# Patient Record
Sex: MALE | Race: WHITE | NOT HISPANIC OR LATINO | Employment: OTHER | URBAN - METROPOLITAN AREA
[De-identification: names, ages, dates, MRNs, and addresses within clinical notes are randomized per-mention and may not be internally consistent; named-entity substitution may affect disease eponyms.]

---

## 2017-01-01 ENCOUNTER — APPOINTMENT (OUTPATIENT)
Dept: GENERAL RADIOLOGY | Facility: HOSPITAL | Age: 72
End: 2017-01-01

## 2017-01-01 ENCOUNTER — HOSPITAL ENCOUNTER (INPATIENT)
Facility: HOSPITAL | Age: 72
LOS: 1 days | End: 2017-07-05
Attending: EMERGENCY MEDICINE | Admitting: INTERNAL MEDICINE

## 2017-01-01 VITALS — WEIGHT: 160 LBS | HEIGHT: 66 IN | RESPIRATION RATE: 29 BRPM | BODY MASS INDEX: 25.71 KG/M2 | TEMPERATURE: 96.6 F

## 2017-01-01 DIAGNOSIS — R65.21 SEPTIC SHOCK (HCC): Primary | ICD-10-CM

## 2017-01-01 DIAGNOSIS — J18.9 PNEUMONIA OF RIGHT MIDDLE LOBE DUE TO INFECTIOUS ORGANISM: ICD-10-CM

## 2017-01-01 DIAGNOSIS — A41.9 SEPTIC SHOCK (HCC): Primary | ICD-10-CM

## 2017-01-01 DIAGNOSIS — N28.9 RENAL INSUFFICIENCY: ICD-10-CM

## 2017-01-01 DIAGNOSIS — N39.0 ACUTE UTI: ICD-10-CM

## 2017-01-01 LAB
ALBUMIN SERPL-MCNC: 3.3 G/DL (ref 3.5–5.2)
ALBUMIN/GLOB SERPL: 0.9 G/DL
ALP SERPL-CCNC: 119 U/L (ref 39–117)
ALT SERPL W P-5'-P-CCNC: 21 U/L (ref 1–41)
ANION GAP SERPL CALCULATED.3IONS-SCNC: 25.4 MMOL/L
ANISOCYTOSIS BLD QL: ABNORMAL
ARTERIAL PATENCY WRIST A: ABNORMAL
AST SERPL-CCNC: 16 U/L (ref 1–40)
ATMOSPHERIC PRESS: 751 MMHG
BACTERIA UR QL AUTO: ABNORMAL /HPF
BASE EXCESS BLDA CALC-SCNC: -4.3 MMOL/L (ref 0–2)
BDY SITE: ABNORMAL
BILIRUB SERPL-MCNC: 0.4 MG/DL (ref 0.1–1.2)
BILIRUB UR QL STRIP: NEGATIVE
BUN BLD-MCNC: 77 MG/DL (ref 8–23)
BUN/CREAT SERPL: 32 (ref 7–25)
CALCIUM SPEC-SCNC: 10.4 MG/DL (ref 8.6–10.5)
CHLORIDE SERPL-SCNC: 108 MMOL/L (ref 98–107)
CLARITY UR: ABNORMAL
CO2 SERPL-SCNC: 22.6 MMOL/L (ref 22–29)
COLOR UR: ABNORMAL
CREAT BLD-MCNC: 2.41 MG/DL (ref 0.76–1.27)
D-LACTATE SERPL-SCNC: 6.1 MMOL/L (ref 0.5–2)
DEPRECATED RDW RBC AUTO: 50.1 FL (ref 37–54)
ERYTHROCYTE [DISTWIDTH] IN BLOOD BY AUTOMATED COUNT: 15.2 % (ref 11.5–14.5)
GFR SERPL CREATININE-BSD FRML MDRD: 27 ML/MIN/1.73
GLOBULIN UR ELPH-MCNC: 3.6 GM/DL
GLUCOSE BLD-MCNC: 390 MG/DL (ref 65–99)
GLUCOSE UR STRIP-MCNC: NEGATIVE MG/DL
HCO3 BLDA-SCNC: 20.3 MMOL/L (ref 22–28)
HCT VFR BLD AUTO: 40.4 % (ref 40.4–52.2)
HGB BLD-MCNC: 12.2 G/DL (ref 13.7–17.6)
HGB UR QL STRIP.AUTO: ABNORMAL
HOLD SPECIMEN: NORMAL
HOLD SPECIMEN: NORMAL
HOROWITZ INDEX BLD+IHG-RTO: 100 %
HYALINE CASTS UR QL AUTO: ABNORMAL /LPF
KETONES UR QL STRIP: NEGATIVE
LEUKOCYTE ESTERASE UR QL STRIP.AUTO: ABNORMAL
LYMPHOCYTES # BLD MANUAL: 1.57 10*3/MM3 (ref 0.9–4.8)
LYMPHOCYTES NFR BLD MANUAL: 6 % (ref 5–12)
LYMPHOCYTES NFR BLD MANUAL: 8 % (ref 19.6–45.3)
MCH RBC QN AUTO: 27.2 PG (ref 27–32.7)
MCHC RBC AUTO-ENTMCNC: 30.2 G/DL (ref 32.6–36.4)
MCV RBC AUTO: 90.2 FL (ref 79.8–96.2)
MODALITY: ABNORMAL
MONOCYTES # BLD AUTO: 1.18 10*3/MM3 (ref 0.2–1.2)
NEUTROPHILS # BLD AUTO: 16.92 10*3/MM3 (ref 1.9–8.1)
NEUTROPHILS NFR BLD MANUAL: 86 % (ref 42.7–76)
NITRITE UR QL STRIP: NEGATIVE
O2 A-A PPRESDIFF RESPIRATORY: 0.1 MMHG
PCO2 BLDA: 35 MM HG (ref 35–45)
PH BLDA: 7.37 PH UNITS (ref 7.35–7.45)
PH UR STRIP.AUTO: 7 [PH] (ref 5–8)
PLAT MORPH BLD: NORMAL
PLATELET # BLD AUTO: 541 10*3/MM3 (ref 140–500)
PMV BLD AUTO: 11.4 FL (ref 6–12)
PO2 BLDA: 77.7 MM HG (ref 80–100)
POTASSIUM BLD-SCNC: 5.3 MMOL/L (ref 3.5–5.2)
PROCALCITONIN SERPL-MCNC: 0.72 NG/ML (ref 0.1–0.25)
PROT SERPL-MCNC: 6.9 G/DL (ref 6–8.5)
PROT UR QL STRIP: ABNORMAL
PSV: 8 CMH2O
RBC # BLD AUTO: 4.48 10*6/MM3 (ref 4.6–6)
RBC # UR: ABNORMAL /HPF
REF LAB TEST METHOD: ABNORMAL
SAO2 % BLDCOA: 95.1 % (ref 92–99)
SCAN SLIDE: NORMAL
SET MECH RESP RATE: 24
SODIUM BLD-SCNC: 156 MMOL/L (ref 136–145)
SP GR UR STRIP: 1.02 (ref 1–1.03)
SPHEROCYTES BLD QL SMEAR: ABNORMAL
SQUAMOUS #/AREA URNS HPF: ABNORMAL /HPF
TOTAL RATE: 37 BREATHS/MINUTE
TROPONIN T SERPL-MCNC: <0.01 NG/ML (ref 0–0.03)
UROBILINOGEN UR QL STRIP: ABNORMAL
VENTILATOR MODE: ABNORMAL
WBC MORPH BLD: NORMAL
WBC NRBC COR # BLD: 19.67 10*3/MM3 (ref 4.5–10.7)
WBC UR QL AUTO: ABNORMAL /HPF
WHOLE BLOOD HOLD SPECIMEN: NORMAL
WHOLE BLOOD HOLD SPECIMEN: NORMAL

## 2017-01-01 PROCEDURE — 71010 HC CHEST PA OR AP: CPT

## 2017-01-01 PROCEDURE — 85007 BL SMEAR W/DIFF WBC COUNT: CPT | Performed by: EMERGENCY MEDICINE

## 2017-01-01 PROCEDURE — 87186 SC STD MICRODIL/AGAR DIL: CPT | Performed by: EMERGENCY MEDICINE

## 2017-01-01 PROCEDURE — 94799 UNLISTED PULMONARY SVC/PX: CPT

## 2017-01-01 PROCEDURE — 36680 INSERT NEEDLE BONE CAVITY: CPT

## 2017-01-01 PROCEDURE — 82803 BLOOD GASES ANY COMBINATION: CPT

## 2017-01-01 PROCEDURE — 85025 COMPLETE CBC W/AUTO DIFF WBC: CPT | Performed by: EMERGENCY MEDICINE

## 2017-01-01 PROCEDURE — 25010000002 PIPERACILLIN SOD-TAZOBACTAM PER 1 G: Performed by: EMERGENCY MEDICINE

## 2017-01-01 PROCEDURE — 83605 ASSAY OF LACTIC ACID: CPT | Performed by: EMERGENCY MEDICINE

## 2017-01-01 PROCEDURE — 87040 BLOOD CULTURE FOR BACTERIA: CPT | Performed by: EMERGENCY MEDICINE

## 2017-01-01 PROCEDURE — 80053 COMPREHEN METABOLIC PANEL: CPT | Performed by: EMERGENCY MEDICINE

## 2017-01-01 PROCEDURE — 84484 ASSAY OF TROPONIN QUANT: CPT | Performed by: EMERGENCY MEDICINE

## 2017-01-01 PROCEDURE — 81001 URINALYSIS AUTO W/SCOPE: CPT | Performed by: EMERGENCY MEDICINE

## 2017-01-01 PROCEDURE — 36600 WITHDRAWAL OF ARTERIAL BLOOD: CPT

## 2017-01-01 PROCEDURE — 87086 URINE CULTURE/COLONY COUNT: CPT | Performed by: EMERGENCY MEDICINE

## 2017-01-01 PROCEDURE — 5A09357 ASSISTANCE WITH RESPIRATORY VENTILATION, LESS THAN 24 CONSECUTIVE HOURS, CONTINUOUS POSITIVE AIRWAY PRESSURE: ICD-10-PCS | Performed by: INTERNAL MEDICINE

## 2017-01-01 PROCEDURE — 94660 CPAP INITIATION&MGMT: CPT

## 2017-01-01 PROCEDURE — 93005 ELECTROCARDIOGRAM TRACING: CPT | Performed by: EMERGENCY MEDICINE

## 2017-01-01 PROCEDURE — 99284 EMERGENCY DEPT VISIT MOD MDM: CPT

## 2017-01-01 PROCEDURE — 84145 PROCALCITONIN (PCT): CPT | Performed by: EMERGENCY MEDICINE

## 2017-01-01 RX ADMIN — SODIUM CHLORIDE 1000 ML: 9 INJECTION, SOLUTION INTRAVENOUS at 11:38

## 2017-01-01 RX ADMIN — TAZOBACTAM SODIUM AND PIPERACILLIN SODIUM 4.5 G: 500; 4 INJECTION, SOLUTION INTRAVENOUS at 12:29

## 2017-01-01 RX ADMIN — SODIUM CHLORIDE 1000 ML: 9 INJECTION, SOLUTION INTRAVENOUS at 12:44

## 2017-07-05 PROBLEM — R65.21 SEPTIC SHOCK (HCC): Status: ACTIVE | Noted: 2017-01-01

## 2017-07-05 PROBLEM — A41.9 SEPTIC SHOCK (HCC): Status: ACTIVE | Noted: 2017-01-01

## 2017-07-05 NOTE — ED NOTES
Report called and given to Nohemy TAVARES on 4 , updated on pt current status, discussed completed ER orders and admitting diagnosis. Notified RN pt will go on palliative care and pt is a DNR.       Rajani Leach RN  07/05/17 5425

## 2017-07-05 NOTE — ED NOTES
Pt sister had no questions at this time. This RN will take pt upstairs.     Rajani Leach, CHAITANYA  07/05/17 [id number]

## 2017-07-05 NOTE — ED PROVIDER NOTES
EMERGENCY DEPARTMENT ENCOUNTER       CHIEF COMPLAINT  Chief Complaint: Dyspnea  History given by: Paramedics  History limited by: Acuity of patient's condition   Room Number: 21/21  PMD: Kirk Dunham MD      HPI:  HPI Comments: Per paramedics, [place] is a [place] who presents to the ED with dyspnea onset about 2-3 days ago. Sx have been worsening since initial onset due to which the paramedics were called today. Upon paramedics' arrival on scene, pt's O2 sat on room air was in the 80s and pt's blood pressure was 70/40.      Patient is a 72 y.o. male presenting with shortness of breath.   Shortness of Breath   Severity:  Severe  Onset quality:  Gradual  Duration: onset about 2-3 days ago.  Timing:  Constant  Progression:  Worsening  Relieved by:  Nothing  Worsened by:  Nothing        PAST MEDICAL HISTORY  Active Ambulatory Problems     Diagnosis Date Noted   • No Active Ambulatory Problems     Resolved Ambulatory Problems     Diagnosis Date Noted   • No Resolved Ambulatory Problems     No Additional Past Medical History         PAST SURGICAL HISTORY  No past surgical history on file.      FAMILY HISTORY  No family history on file.      SOCIAL HISTORY  Social History     Social History   • Marital status: Single     Spouse name: N/A   • Number of children: N/A   • Years of education: N/A     Occupational History   • Not on file.     Social History Main Topics   • Smoking status: Not on file   • Smokeless tobacco: Not on file   • Alcohol use Not on file   • Drug use: Not on file   • Sexual activity: Not on file     Other Topics Concern   • Not on file     Social History Narrative         ALLERGIES  Review of patient's allergies indicates no known allergies.        REVIEW OF SYSTEMS  Review of Systems   Unable to perform ROS: Acuity of condition   Respiratory: Positive for shortness of breath.             PHYSICAL EXAM  ED Triage Vitals   Temp Heart Rate Resp BP SpO2   07/05/17 1213 07/05/17 1114 07/05/17 1114  07/05/17 1114 07/05/17 1120   96.6 °F (35.9 °C) 144 36 71/42 81 %      Temp src Heart Rate Source Patient Position BP Location FiO2 (%)   07/05/17 1212 07/05/17 1212 07/05/17 1212 07/05/17 1212 --   Tympanic Monitor Lying Left arm          Physical Exam   Constitutional: He appears distressed.   HENT:   Mouth/Throat: Mucous membranes are dry.   Dysmorphic nose.    Eyes:   Right pupil nonreactive (this is chronic). Left pupil is reactive.    Neck: Neck supple.   Cardiovascular: Regular rhythm and normal heart sounds.  Tachycardia present.    Pulmonary/Chest: He is in respiratory distress. He has no decreased breath sounds. He has no wheezes. He has no rhonchi. He has no rales.   Abdominal: Soft. There is no tenderness. There is no rebound and no guarding.   Genitourinary:   Genitourinary Comments: Pt has an indwelling solano catheter inserted.   Neurological:   Pt is obtunded.    Skin: Skin is warm.   Skin graft to the forehead. There is a graft donor site to the left leg.   Psychiatric:   Unable to assess due to the acuity of patient's condition.    Nursing note and vitals reviewed.          LAB RESULTS  Recent Results (from the past 24 hour(s))   Comprehensive Metabolic Panel    Collection Time: 07/05/17 11:45 AM   Result Value Ref Range    Glucose 390 (H) 65 - 99 mg/dL    BUN 77 (H) 8 - 23 mg/dL    Creatinine 2.41 (H) 0.76 - 1.27 mg/dL    Sodium 156 (H) 136 - 145 mmol/L    Potassium 5.3 (H) 3.5 - 5.2 mmol/L    Chloride 108 (H) 98 - 107 mmol/L    CO2 22.6 22.0 - 29.0 mmol/L    Calcium 10.4 8.6 - 10.5 mg/dL    Total Protein 6.9 6.0 - 8.5 g/dL    Albumin 3.30 (L) 3.50 - 5.20 g/dL    ALT (SGPT) 21 1 - 41 U/L    AST (SGOT) 16 1 - 40 U/L    Alkaline Phosphatase 119 (H) 39 - 117 U/L    Total Bilirubin 0.4 0.1 - 1.2 mg/dL    eGFR Non African Amer 27 (L) >60 mL/min/1.73    Globulin 3.6 gm/dL    A/G Ratio 0.9 g/dL    BUN/Creatinine Ratio 32.0 (H) 7.0 - 25.0    Anion Gap 25.4 mmol/L   Procalcitonin    Collection Time:  07/05/17 11:45 AM   Result Value Ref Range    Procalcitonin 0.72 (C) 0.10 - 0.25 ng/mL   Lactic Acid, Plasma    Collection Time: 07/05/17 11:45 AM   Result Value Ref Range    Lactate 6.1 (C) 0.5 - 2.0 mmol/L   Troponin    Collection Time: 07/05/17 11:45 AM   Result Value Ref Range    Troponin T <0.010 0.000 - 0.030 ng/mL   CBC Auto Differential    Collection Time: 07/05/17 11:45 AM   Result Value Ref Range    WBC 19.67 (H) 4.50 - 10.70 10*3/mm3    RBC 4.48 (L) 4.60 - 6.00 10*6/mm3    Hemoglobin 12.2 (L) 13.7 - 17.6 g/dL    Hematocrit 40.4 40.4 - 52.2 %    MCV 90.2 79.8 - 96.2 fL    MCH 27.2 27.0 - 32.7 pg    MCHC 30.2 (L) 32.6 - 36.4 g/dL    RDW 15.2 (H) 11.5 - 14.5 %    RDW-SD 50.1 37.0 - 54.0 fl    MPV 11.4 6.0 - 12.0 fL    Platelets 541 (H) 140 - 500 10*3/mm3   Scan Slide    Collection Time: 07/05/17 11:45 AM   Result Value Ref Range    Scan Slide     Manual Differential    Collection Time: 07/05/17 11:45 AM   Result Value Ref Range    Neutrophil % 86.0 (H) 42.7 - 76.0 %    Lymphocyte % 8.0 (L) 19.6 - 45.3 %    Monocyte % 6.0 5.0 - 12.0 %    Neutrophils Absolute 16.92 (H) 1.90 - 8.10 10*3/mm3    Lymphocytes Absolute 1.57 0.90 - 4.80 10*3/mm3    Monocytes Absolute 1.18 0.20 - 1.20 10*3/mm3    Anisocytosis Mod/2+ None Seen    Spherocytes Slight/1+ None Seen    WBC Morphology Normal Normal    Platelet Morphology Normal Normal   Urinalysis With / Culture If Indicated    Collection Time: 07/05/17 11:46 AM   Result Value Ref Range    Color, UA Dark Yellow (A) Yellow, Straw    Appearance, UA Cloudy (A) Clear    pH, UA 7.0 5.0 - 8.0    Specific Gravity, UA 1.025 1.005 - 1.030    Glucose, UA Negative Negative    Ketones, UA Negative Negative    Bilirubin, UA Negative Negative    Blood, UA Large (3+) (A) Negative    Protein,  mg/dL (2+) (A) Negative    Leuk Esterase, UA Large (3+) (A) Negative    Nitrite, UA Negative Negative    Urobilinogen, UA 1.0 E.U./dL 0.2 - 1.0 E.U./dL   Urinalysis, Microscopic Only     Collection Time: 07/05/17 11:46 AM   Result Value Ref Range    RBC, UA Unable to determine due to loaded field (A) None Seen, 0-2 /HPF    WBC, UA Too Numerous to Count (A) None Seen, 0-2 /HPF    Bacteria, UA Unable to determine due to loaded field (A) None Seen /HPF    Squamous Epithelial Cells, UA Unable to determine due to loaded field (A) None Seen, 0-2 /HPF    Hyaline Casts, UA Unable to determine due to loaded field None Seen /LPF    Methodology Manual Light Microscopy    Light Blue Top    Collection Time: 07/05/17 11:56 AM   Result Value Ref Range    Extra Tube hold for add-on    Green Top (Gel)    Collection Time: 07/05/17 11:56 AM   Result Value Ref Range    Extra Tube Hold for add-ons.    Lavender Top    Collection Time: 07/05/17 11:56 AM   Result Value Ref Range    Extra Tube hold for add-on    Gold Top - SST    Collection Time: 07/05/17 11:56 AM   Result Value Ref Range    Extra Tube Hold for add-ons.    Blood Gas, Arterial    Collection Time: 07/05/17 12:02 PM   Result Value Ref Range    Site Arterial: left brachial     Pete's Test N/A     pH, Arterial 7.372 7.350 - 7.450 pH units    pCO2, Arterial 35.0 35.0 - 45.0 mm Hg    pO2, Arterial 77.7 (L) 80.0 - 100.0 mm Hg    HCO3, Arterial 20.3 (L) 22.0 - 28.0 mmol/L    Base Excess, Arterial -4.3 (L) 0.0 - 2.0 mmol/L    O2 Saturation Calculated 95.1 92.0 - 99.0 %    A-a Gradiant 0.1 mmHg    Barometric Pressure for Blood Gas 751.0 mmHg    Modality BiPAP     FIO2 100 %    Ventilator Mode BiLevel     Set Mech Resp Rate 24     Rate 37 Breaths/minute    PSV 8 cmH2O       Ordered the above labs and reviewed the results.        RADIOLOGY  XR Chest 1 View   Final Result   A consolidative opacity in the right mid to lower lung,   follow-up recommended.       This report was finalized on 7/5/2017 12:18 PM by Dr. Lamine Frias MD.    Interpreted by radiologist. Independently viewed by me.                Ordered the above noted radiological studies. Reviewed by me  in PACS.       PROCEDURES  IO Line Insertion  Date/Time: 7/5/2017 11:24 AM  Performed by: NAVEED IRVIN  Authorized by: NAVEED IRVIN   Consent: The procedure was performed in an emergent situation.  Indications: clinical deterioration, fluid administration and rapid vascular access  Local anesthesia used: no    Anesthesia:  Local anesthesia used: no  Sedation:  Patient sedated: no    Insertion site: right proximal tibia  Site preparation: povidone-iodine  Preparation: Patient was prepped and draped in the usual sterile fashion.  Insertion device: drill device  Insertion: needle was inserted through the bony cortex  Number of attempts: 1  Confirmation method: stability of the needle  Secured with: protective shield  Patient tolerance: Patient tolerated the procedure well with no immediate complications  Critical Care  Performed by: NAVEED IRVIN  Authorized by: NAVEED IRVIN   Total critical care time: 40 minutes  Critical care time was exclusive of separately billable procedures and treating other patients.  Critical care was necessary to treat or prevent imminent or life-threatening deterioration of the following conditions: respiratory failure, sepsis and renal failure.  Critical care was time spent personally by me on the following activities: development of treatment plan with patient or surrogate, discussions with consultants, evaluation of patient's response to treatment, examination of patient, obtaining history from patient or surrogate, ordering and performing treatments and interventions, ordering and review of laboratory studies, ordering and review of radiographic studies, pulse oximetry and re-evaluation of patient's condition.          EKG:           EKG time: 11:39 AM  Rhythm/Rate: Sinus tachycardia rate 132  P waves and NV: Normal P waves  Q waves in II, III, aVF   ST and T waves: Normal ST     Interpreted Contemporaneously by me, independently viewed  No old EKG is available for  "comparison           PROGRESS AND CONSULTS  ED Course   Comment By Time   1:05 PM  Patient here with altered mental status.  Appears to be septic.  Elevated WBC, Lactic acid.  Has UTI and pneumonia.  Have had multiple discussions with family member who does not want aggressive measures.  Does not want ICU or pressors.  Blood pressure is 50s after fluids.  Sister would like patient to be palliative care.  Will take him off bipap.  Discussed with Dr. Connors who will admit. Hayden Juan MD 07/05 1307     11:22 AM: Discussed with pt's family member via phone regarding the acuity of patient's condition (pt's HR currently is in the 140s; O2 sat is 88% on room air; BP is 71/42). They state that they do not want pt to be intubated or receive CPR at this time (no aggressive measures). They state that it is alright to start pt on a BiPAP machine if necessary. Decision time to admit: Now.     11:24 AM: IO was inserted. Pt is currently receiving IV fluids as pt is hypotensive. Respiratory tech is at bedside placing pt on BiPAP.     11:35 AM: Blood work, CXR, UA, EKG, ABG, blood cultures, lactic acid, and procalcitonin ordered for further evaluation. IV fluids ordered to hydrate pt.     11:43 AM: Rechecked pt. Pt is tolerating BiPAP without significant difficulty.     12:02 PM: Rechecked pt. Pt's sister is at bedside. She provides additional hx: per sister, pt has h/o recurrent sinus cancer for which pt had extensive surgery performed (pt is currently undergoing reconstruction of his nose). At pt's neurological baseline, pt is awake and alert, but nonverbal secondary to h/o stroke. For the past several days, pt has had difficulty tolerating PO intake (pt has been \"throwing food back up\").     Informed pt's sister that pt is in critical condition. We are awaiting lab and imaging results. Sister states that she does not want pt started on pressors if pt's BP does not increase. She would like pt to be comfortable.     On " re-evaluation, pt's HR is 128. Pt continues to tolerate BiPAP without significant difficulty. Pt is receiving IV fluids. Pt will receive second round of IV fluids.     12:12 PM: Ordered Zosyn as pt's lactic acid is 6.1.    12:20 PM: Rechecked pt. Informed pt's family that pt's lactic acid is 6.1. I am still awaiting additional lab/imaging results. Pt will receive IV antibiotics. Sister agrees with plan.      12:43 PM: Pt remains hypotensive. 30 cc/kg bolus ordered.     12:52 PM: Rechecked pt. Pt's BP is currently 54/35 (pt is receiving 30 cc/kg bolus). Pt's HR is 123.     Informed pt's sister that pt's WBC is 19.67. UA suggests UTI. CXR shows right mid to lower lung opacity most likely pneumonia. Creatinine is 2.41. Pt is in critical condition and has poor prognosis for survival. Sister would like pt to be comfort measures only.     12:58 PM: Discussed case with Dr. Connors, palliative care physician. He will admit pt to a palliative care bed.     1:06 PM: Pt will be taken off of BiPAP. Pt's 30 cc/kg bolus will be stopped.             MEDICAL DECISION MAKING      MDM  Number of Diagnoses or Management Options     Amount and/or Complexity of Data Reviewed  Clinical lab tests: ordered and reviewed (Creatinine is 2.41. WBC is 19.67. Lactic acid is 6.1.)  Tests in the radiology section of CPT®: ordered and reviewed (CXR: A consolidative opacity in the right mid to lower lung is present)  Tests in the medicine section of CPT®: reviewed and ordered (EKG interpreted.   )  Obtain history from someone other than the patient: yes (Paramedics provide significant history. Sister provides additional hx. )  Discuss the patient with other providers: yes (Discussed case with Dr. Connors, palliative care physician. He will admit pt to a palliative care bed. )    Patient Progress  Patient progress: other (comment) (Pt is in critical condition. )             DIAGNOSIS  Final diagnoses:   Septic shock   Pneumonia of right middle lobe  due to infectious organism   Renal insufficiency   Acute UTI         DISPOSITION  Pt admitted to palliative care.      ADMISSION    Discussed treatment plan and reason for admission with pt/family and admitting physician.  Pt/family voiced understanding of the plan for admission for comfort measures as needed.                 Latest Documented Vital Signs:  As of 1:13 PM  BP- (!) 53/34 HR- (!) 122 Temp- 96.6 °F (35.9 °C) (Tympanic) O2 sat- 94%        --  Documentation assistance provided by enrique Tucker for Dr. Drake MD.  Information recorded by the scribe was done at my direction and has been verified and validated by me.                   Alejandra Tucker  07/05/17 7732       Hayden Juan MD  07/05/17 7137

## 2017-07-05 NOTE — ED NOTES
"MD stated \"he is going to pallative care stop fluids and bipap per family request\".     Rajani Leach RN  07/05/17 6718    "

## 2017-07-06 NOTE — PROGRESS NOTES
Discharge Planning Assessment  [facility]     Patient Name: Jean Lancaster  MRN: [id number]  Today's Date: 2017    Admit Date: 2017          Discharge Needs Assessment     None            Discharge Plan       17 1634    Case Management/Social Work Plan    Additional Comments The patient was transferred to [facility] from ER on 17 @  14:15 and the patient  on 17 @ 14:25. MARTY Palacios RN, CCP    Final Note    Final Note  The patient  on 17 @ 14:25. MARTY Palacios RN, CCP        Discharge Placement     No information found        Expected Discharge Date and Time     Expected Discharge Date Expected Discharge Time    2017  4:00 PM              Demographic Summary     None            Functional Status     None            Psychosocial     None            Abuse/Neglect     None            Legal     None            Substance Abuse     None            Patient Forms     None          Svetlana Palacios, RN

## 2017-07-07 LAB — BACTERIA SPEC AEROBE CULT: ABNORMAL

## 2017-07-10 LAB
BACTERIA SPEC AEROBE CULT: NORMAL
BACTERIA SPEC AEROBE CULT: NORMAL